# Patient Record
Sex: MALE | Race: WHITE | Employment: FULL TIME | URBAN - METROPOLITAN AREA
[De-identification: names, ages, dates, MRNs, and addresses within clinical notes are randomized per-mention and may not be internally consistent; named-entity substitution may affect disease eponyms.]

---

## 2018-04-25 ENCOUNTER — HOSPITAL ENCOUNTER (OUTPATIENT)
Dept: PHYSICAL THERAPY | Age: 53
Discharge: HOME | End: 2018-04-25
Payer: COMMERCIAL

## 2018-04-25 PROCEDURE — 97161 PT EVAL LOW COMPLEX 20 MIN: CPT | Performed by: PHYSICAL THERAPIST

## 2018-04-25 PROCEDURE — 97110 THERAPEUTIC EXERCISES: CPT | Performed by: PHYSICAL THERAPIST

## 2018-04-25 NOTE — PROGRESS NOTES
Leah Mars 31  [facility] AT [facility] [address] [facility], [address] ,[address], [address]  Phone: [phone]  Fax: [phone] / [address] [address] PHYSICAL THERAPY SERVICES  Patient Name: Troy Arce : 1965   Medical   Diagnosis: Right elbow pain [M25.521] Treatment Diagnosis: R Elbow Pain   Onset Date: 18     Referral Source: Anibal Castaneda,* Start of Care [facility]): 2018   Prior Hospitalization: See medical history Provider #: [id number]   Prior Level of Function: Full functional use/strength of right (domnant) UE   Comorbidities: Prior right elbow injury several years ago   Medications: Verified on Patient Summary List   The Plan of Care and following information is based on the information from the initial evaluation.   ===========================================================================================  Assessment / key information:  47 y/o male presents to PT s/p R elbow surgery for bicep tendon repair. He notes an injury when he reached to catch his motorcycle while moving it. He had pain and felt a pop in his elbow, which eventually turning into a significant bruise. MRI found a rupture of the bicep tendon, and he had surgery on 18. Pt presents wearing a brace on right elbow locked at 90°. His surgical incision was not seen today, as it was covered with a dressing (no drainage noted). He has mild-moderate swelling in the right elbow and proximal forearm area. PROM of right elbow was measured at 68° from full extension and 120° of flexion. Supination - 15° and pronation 25°. Pt was instructed on wearing brace at all times, locked at 90°. He was instructed in gentle ROM exercise for right elbow with emphasis on any bicep activation. Wrist and gripping exercises were added.   Pt has ROM loss, pain, swelling, weakness, and limited use of right (dominant) UE s/p distal bicep tendon repair.  ===========================================================================================  Eval Complexity: History LOW Complexity : Zero comorbidities / personal factors that will impact the outcome / POC;  Examination  MEDIUM Complexity : 3 Standardized tests and measures addressing body structure, function, activity limitation and / or participation in recreation ; Presentation MEDIUM Complexity : Evolving with changing characteristics ; Decision Making MEDIUM Complexity : FOTO score of 26-74; Overall Complexity LOW   Problem List: pain affecting function, decrease ROM, decrease strength, edema affecting function, decrease ADL/ functional abilitiies, decrease activity tolerance and decrease flexibility/ joint mobility   Treatment Plan may include any combination of the following: Therapeutic exercise, Therapeutic activities, Neuromuscular re-education, Physical agent/modality, Gait/balance training, Manual therapy, Dry Needling, Aquatic therapy, Patient education, Self Care training, Functional mobility training, Home safety training and Stair training  Patient / Family readiness to learn indicated by: asking questions, trying to perform skills and interest  Persons(s) to be included in education: patient (P)  Barriers to Learning/Limitations: None  Measures taken:    Patient Goal (s): \"to get full motion back in my arm and strengthen my right bicep so I can get back to working out on a full time basis\"   Patient self reported health status: excellent  Rehabilitation Potential: good   Short Term Goals: To be accomplished in  2  weeks:  1. Pt to manage pain to </= 2/10 with activity modification, use of cold packs, and HEP. 2. Pt independent with ROM exercises for right elbow without activating bicep muscle.  Long Term Goals: To be accomplished in  6  weeks:  1. Pt to attain full right elbow PROM, 0-135° without symptoms.   2. Pt to increase FOTO score from 32 to /= 60.  3. Pt independent with HEP for slow and progressive AA->AROM of right elbow motions. Frequency / Duration:   Patient to be seen  2-3  times per week for 6  weeks:  Patient / Caregiver education and instruction: self care, activity modification, brace/ splint application and exercises  G-Codes (GP): amarjit  Therapist Signature: Kimo Brenner PT Date: 4/52/8998   Certification Period: na Time: 10:05 AM   ===========================================================================================  I certify that the above Physical Therapy Services are being furnished while the patient is under my care. I agree with the treatment plan and certify that this therapy is necessary. Physician Signature:        Date:       Time:     Please sign and return to [facility] or you may fax the signed copy to [phone]. Thank you.

## 2018-04-25 NOTE — PROGRESS NOTES
PHYSICAL THERAPY - DAILY TREATMENT NOTE    Patient Name: Lady Peres        Date: 18  : 1965   YES Patient  Verified  Visit #:     Insurance: Payor: Isabella Conception / Plan: Vikas Chinchilla / Product Type: HMO /      In time: 6:10 Out time: 6:55   Total Treatment Time: 45     Medicare Time Tracking (below)   Total Timed Codes (min):  na 1:1 Treatment Time:  na     TREATMENT AREA = Right elbow pain [M25.521]    SUBJECTIVE  Pain Level (on 0 to 10 scale):  3  / 10   Medication Changes/New allergies or changes in medical history, any new surgeries or procedures?     NO    If yes, update Summary List   Subjective Functional Status/Changes:  []  No changes reported     See eval/POC          OBJECTIVE  Modalities Rationale:     decrease edema, decrease inflammation and decrease pain to improve patient's ability to prevent soreness   min [] Estim, type/location:                                      []  att     []  unatt     []  w/US     []  w/ice    []  w/heat    min []  Mechanical Traction: type/lbs                   []  pro   []  sup   []  int   []  cont    []  before manual    []  after manual    min []  Ultrasound, settings/location:      min []  Iontophoresis w/ dexamethasone, location:                                               []  take home patch       []  in clinic   10 min [x]  Ice     []  Heat    location/position: R elbow- supine after treatment    min []  Vasopneumatic Device, press/temp:     min []  Other:    [] Skin assessment post-treatment (if applicable):    []  intact    []  redness- no adverse reaction     []redness  adverse reaction:        15 min Therapeutic Exercise:  [x]  See flow sheet   Rationale:      increase ROM and improve coordination to improve the patients ability to regain ROM per protocol      min Therapeutic Activity:         min Neuromuscular Re-ed:         min Gait Training:       min Patient Education:  YES  Reviewed HEP   []  Progressed/Changed HEP based on:        Other Objective/Functional Measures:    FOTO - 32     Post Treatment Pain Level (on 0 to 10) scale:   1  / 10     ASSESSMENT  Assessment/Changes in Function:     Pt has ROM loss, swelling, pain, weakness and limited functional use s/p bicep tendon repair       []  See Progress Note/Recertification   Patient will continue to benefit from skilled PT services to modify and progress therapeutic interventions, address functional mobility deficits, address ROM deficits, address strength deficits, analyze and address soft tissue restrictions, analyze and cue movement patterns, analyze and modify body mechanics/ergonomics and assess and modify postural abnormalities to attain remaining goals.    Progress toward goals / Updated goals:    Goals established today     PLAN  [x]  Upgrade activities as tolerated YES Continue plan of care   []  Discharge due to :    []  Other:      Therapist: Cassie Morgan PT    Date: 4/25/2018 Time: 10:04 AM     Future Appointments  Date Time Provider David Bowen   5/1/2018 12:00 PM Cassie Morgan PT [facility] [place]   5/3/2018 1:00 PM Cassie Morgan PT [facility]

## 2018-05-01 ENCOUNTER — HOSPITAL ENCOUNTER (OUTPATIENT)
Dept: PHYSICAL THERAPY | Age: 53
Discharge: HOME | End: 2018-05-01
Payer: COMMERCIAL

## 2018-05-01 PROCEDURE — 97110 THERAPEUTIC EXERCISES: CPT | Performed by: PHYSICAL THERAPIST

## 2018-05-01 PROCEDURE — 97140 MANUAL THERAPY 1/> REGIONS: CPT | Performed by: PHYSICAL THERAPIST

## 2018-05-01 NOTE — PROGRESS NOTES
PHYSICAL THERAPY - DAILY TREATMENT NOTE    Patient Name: Trena Titus        Date: 2018  : 1965   YES Patient  Verified  Visit #:     Insurance: Payor: Berta Covarrubias / Plan: Calli Shahid / Product Type: HMO /      In time: 11:50 Out time: 12:40   Total Treatment Time: 50     Medicare Time Tracking (below)   Total Timed Codes (min):  na 1:1 Treatment Time:  na     TREATMENT AREA = Right elbow pain [M25.521]    SUBJECTIVE  Pain Level (on 0 to 10 scale):  2  / 10   Medication Changes/New allergies or changes in medical history, any new surgeries or procedures? NO    If yes, update Summary List   Subjective Functional Status/Changes:  []  No changes reported     Pt reports developing some right sided upper cervical pain since he has been wearing over shoulder sling support for elbow brace.           OBJECTIVE  Modalities Rationale:     decrease edema, decrease inflammation and decrease pain to improve patient's ability to prevent soreness   min [] Estim, type/location:                                      []  att     []  unatt     []  w/US     []  w/ice    []  w/heat    min []  Mechanical Traction: type/lbs                   []  pro   []  sup   []  int   []  cont    []  before manual    []  after manual    min []  Ultrasound, settings/location:      min []  Iontophoresis w/ dexamethasone, location:                                               []  take home patch       []  in clinic   10 min [x]  Ice     []  Heat    location/position: R elbow- supine after treatment    min []  Vasopneumatic Device, press/temp:     min []  Other:    [] Skin assessment post-treatment (if applicable):    []  intact    []  redness- no adverse reaction     []redness  adverse reaction:        30 min Therapeutic Exercise:  [x]  See flow sheet   Rationale:      increase ROM, increase strength and improve coordination to improve the patients ability to regain ROM per protocol     10 min Manual Therapy: Oscillations for right elbow supination and pronation, wrist PROM and shoulder ABD and elevation   Rationale:      increase ROM and increase tissue extensibility to improve patient's ability to regain ROM per protocol      min Therapeutic Activity:         min Neuromuscular Re-ed:         min Gait Training:       min Patient Education:  YES  Reviewed HEP   []  Progressed/Changed HEP based on: Other Objective/Functional Measures:    Pt able to achieve -55 degrees from full extension with supine active elbow extension against gravity     Post Treatment Pain Level (on 0 to 10) scale:   1  / 10     ASSESSMENT  Assessment/Changes in Function:     Pt tolerating all therex well, he did not have any symptoms into elbow. Added cervical stretching to help alleviate symptoms in neck. []  See Progress Note/Recertification   Patient will continue to benefit from skilled PT services to modify and progress therapeutic interventions, address functional mobility deficits, address ROM deficits, address strength deficits, analyze and address soft tissue restrictions, analyze and cue movement patterns, analyze and modify body mechanics/ergonomics and assess and modify postural abnormalities to attain remaining goals. Progress toward goals / Updated goals: Will continue to progress ROM per protocol.      PLAN  [x]  Upgrade activities as tolerated YES Continue plan of care   []  Discharge due to :    []  Other:      Therapist: Kriss Ramirez PT    Date: 5/1/2018 Time: 9:06 AM     Future Appointments  Date Time Provider David Bowen   5/1/2018 12:00 PM Kriss Ramirez PT [facility] [place]   5/3/2018 1:00 PM Kriss Ramirez PT [facility] [place]

## 2018-05-03 ENCOUNTER — HOSPITAL ENCOUNTER (OUTPATIENT)
Dept: PHYSICAL THERAPY | Age: 53
Discharge: HOME | End: 2018-05-03
Payer: COMMERCIAL

## 2018-05-03 PROCEDURE — 97110 THERAPEUTIC EXERCISES: CPT | Performed by: PHYSICAL THERAPIST

## 2018-05-03 PROCEDURE — 97140 MANUAL THERAPY 1/> REGIONS: CPT | Performed by: PHYSICAL THERAPIST

## 2018-05-03 NOTE — PROGRESS NOTES
PHYSICAL THERAPY - DAILY TREATMENT NOTE    Patient Name: Minerva Vernon        Date: 5/3/2018  : 1965   YES Patient  Verified  Visit #:   3   of   12  Insurance: Payor: Elois Schlatter / Plan: Diana Mask / Product Type: HMO /      In time: 1:00 Out time: 1:50   Total Treatment Time: 45     Medicare Time Tracking (below)   Total Timed Codes (min):  na 1:1 Treatment Time:  na     TREATMENT AREA = Right elbow pain [M25.521]    SUBJECTIVE  Pain Level (on 0 to 10 scale):  1  / 10   Medication Changes/New allergies or changes in medical history, any new surgeries or procedures? NO    If yes, update Summary List   Subjective Functional Status/Changes:  []  No changes reported     Pt reports seeing MD and had stitches removed.           OBJECTIVE  Modalities Rationale:     decrease edema, decrease inflammation and decrease pain to improve patient's ability to prevent soreness   min [] Estim, type/location:                                      []  att     []  unatt     []  w/US     []  w/ice    []  w/heat    min []  Mechanical Traction: type/lbs                   []  pro   []  sup   []  int   []  cont    []  before manual    []  after manual    min []  Ultrasound, settings/location:      min []  Iontophoresis w/ dexamethasone, location:                                               []  take home patch       []  in clinic   10 min [x]  Ice     []  Heat    location/position: R elbow - supine after treatment    min []  Vasopneumatic Device, press/temp:     min []  Other:    [] Skin assessment post-treatment (if applicable):    []  intact    []  redness- no adverse reaction     []redness  adverse reaction:        25 min Therapeutic Exercise:  [x]  See flow sheet   Rationale:      increase ROM and improve coordination to improve the patients ability to progress per protocol     10 min Manual Therapy: Oscillations and manual stretching to supination/pronation at 90° flexion followed by flexion   Rationale: decrease pain and increase ROM to improve patient's ability to regain ROM per protocol     min Therapeutic Activity:         min Neuromuscular Re-ed:         min Gait Training:       min Patient Education:  YES  Reviewed HEP   []  Progressed/Changed HEP based on: Other Objective/Functional Measures:    Pt achieved 38° from full extension today     Post Treatment Pain Level (on 0 to 10) scale:   1  / 10     ASSESSMENT  Assessment/Changes in Function:     Pt is progressing well, and was encouraged to perform only gentle ROM. []  See Progress Note/Recertification   Patient will continue to benefit from skilled PT services to modify and progress therapeutic interventions, address functional mobility deficits, address ROM deficits, address strength deficits, analyze and address soft tissue restrictions, analyze and cue movement patterns and analyze and modify body mechanics/ergonomics to attain remaining goals. Progress toward goals / Updated goals: Will continue to prorgess PROM as tolerated.      PLAN  [x]  Upgrade activities as tolerated YES Continue plan of care   []  Discharge due to :    []  Other:      Therapist: Javed Bryant PT    Date: 5/3/2018 Time: 8:58 AM     Future Appointments  Date Time Provider David Bowen   5/3/2018 1:00 PM Javed Bryant PT [facility] [place]   5/9/2018 4:00 PM Javed Bryant PT [facility] [place]

## 2018-05-09 ENCOUNTER — HOSPITAL ENCOUNTER (OUTPATIENT)
Dept: PHYSICAL THERAPY | Age: 53
Discharge: HOME | End: 2018-05-09
Payer: COMMERCIAL

## 2018-05-09 PROCEDURE — 97140 MANUAL THERAPY 1/> REGIONS: CPT | Performed by: PHYSICAL THERAPIST

## 2018-05-09 PROCEDURE — 97110 THERAPEUTIC EXERCISES: CPT | Performed by: PHYSICAL THERAPIST

## 2018-05-09 NOTE — PROGRESS NOTES
PHYSICAL THERAPY - DAILY TREATMENT NOTE    Patient Name: Halie Fuentes        Date: 2018  : 1965   YES Patient  Verified  Visit #:     Insurance: Payor: [employer] / Plan: Pepito Cohen / Product Type: HMO /      In time: 4:00 Out time: 4:50   Total Treatment Time: 50     Medicare Time Tracking (below)   Total Timed Codes (min):  na 1:1 Treatment Time:  na     TREATMENT AREA = Right elbow pain [M25.521]    SUBJECTIVE  Pain Level (on 0 to 10 scale):  0  / 10   Medication Changes/New allergies or changes in medical history, any new surgeries or procedures? NO    If yes, update Summary List   Subjective Functional Status/Changes:  []  No changes reported     Pt notes elbow ROM is improving slowly, and he is wearing brace at all times.           OBJECTIVE  Modalities Rationale:     decrease edema, decrease inflammation and decrease pain to improve patient's ability to prevent soreness   min [] Estim, type/location:                                      []  att     []  unatt     []  w/US     []  w/ice    []  w/heat    min []  Mechanical Traction: type/lbs                   []  pro   []  sup   []  int   []  cont    []  before manual    []  after manual    min []  Ultrasound, settings/location:      min []  Iontophoresis w/ dexamethasone, location:                                               []  take home patch       []  in clinic   10 min [x]  Ice     []  Heat    location/position: R Elbow - supine after treatment    min []  Vasopneumatic Device, press/temp:     min []  Other:    [] Skin assessment post-treatment (if applicable):    []  intact    []  redness- no adverse reaction     []redness  adverse reaction:        30 min Therapeutic Exercise:  [x]  See flow sheet   Rationale:      increase ROM, increase strength and improve coordination to improve the patients ability to regain ROM per protocol     10 min Manual Therapy: scar mobilization and pronation/supination stretch as tolerated Rationale:      decrease pain, increase ROM and increase tissue extensibility to improve patient's ability to regain functional ROM     min Therapeutic Activity:         min Neuromuscular Re-ed:         min Gait Training:       min Patient Education:  YES  Reviewed HEP   []  Progressed/Changed HEP based on: Other Objective/Functional Measures:    Achieved 135° of passive flexion, and -23° for full extension     Post Treatment Pain Level (on 0 to 10) scale:   0  / 10     ASSESSMENT  Assessment/Changes in Function:     Had pt perform scar/skin mobilization with movement during supine tricep press to help increase anterior elbow scar mobility. Extension ROM significantly limited when holding skin. []  See Progress Note/Recertification   Patient will continue to benefit from skilled PT services to modify and progress therapeutic interventions, address functional mobility deficits, address ROM deficits, address strength deficits, analyze and address soft tissue restrictions, analyze and cue movement patterns, analyze and modify body mechanics/ergonomics and assess and modify postural abnormalities to attain remaining goals. Progress toward goals / Updated goals: Will continue to progress therex per protocol.      PLAN  [x]  Upgrade activities as tolerated YES Continue plan of care   []  Discharge due to :    []  Other:      Therapist: Malka Plaza PT    Date: 5/9/2018 Time: 10:59 AM     Future Appointments  Date Time Provider David Bowen   5/9/2018 4:00 PM Malka Plaza PT [facility] [place]

## 2018-05-17 ENCOUNTER — HOSPITAL ENCOUNTER (OUTPATIENT)
Dept: PHYSICAL THERAPY | Age: 53
Discharge: HOME | End: 2018-05-17
Payer: COMMERCIAL

## 2018-05-17 PROCEDURE — 97140 MANUAL THERAPY 1/> REGIONS: CPT | Performed by: PHYSICAL THERAPIST

## 2018-05-17 PROCEDURE — 97110 THERAPEUTIC EXERCISES: CPT | Performed by: PHYSICAL THERAPIST

## 2018-05-17 NOTE — PROGRESS NOTES
PHYSICAL THERAPY - DAILY TREATMENT NOTE    Patient Name: Annabelle Nuñez        Date: 2018  : 1965   YES Patient  Verified  Visit #:      of   12  Insurance: Payor: Armaan Beltre / Plan: [employer] Back / Product Type: HMO /      In time: 1:55 Out time: 2:45   Total Treatment Time: 50     Medicare Time Tracking (below)   Total Timed Codes (min):  na 1:1 Treatment Time:  na     TREATMENT AREA = Right elbow pain [M25.521]    SUBJECTIVE  Pain Level (on 0 to 10 scale):  0  / 10   Medication Changes/New allergies or changes in medical history, any new surgeries or procedures? NO    If yes, update Summary List   Subjective Functional Status/Changes:  []  No changes reported     Pt reports doing HEP and wearing brace at all times. He expresses concern about lump in anterior elbow just deep to scar.           OBJECTIVE  Modalities Rationale:     decrease edema, decrease inflammation and decrease pain to improve patient's ability to prevent soreness   min [] Estim, type/location:                                      []  att     []  unatt     []  w/US     []  w/ice    []  w/heat    min []  Mechanical Traction: type/lbs                   []  pro   []  sup   []  int   []  cont    []  before manual    []  after manual    min []  Ultrasound, settings/location:      min []  Iontophoresis w/ dexamethasone, location:                                               []  take home patch       []  in clinic   10 min [x]  Ice     []  Heat    location/position: R elbow - supine after treatment    min []  Vasopneumatic Device, press/temp:     min []  Other:    [] Skin assessment post-treatment (if applicable):    []  intact    []  redness- no adverse reaction     []redness  adverse reaction:        30 min Therapeutic Exercise:  [x]  See flow sheet   Rationale:      increase ROM, increase strength and improve coordination to improve the patients ability to regain functional ROM per protocol     10 min Manual Therapy: PROM into supination and pronation - scar mobilization   Rationale:      decrease pain, increase ROM and increase tissue extensibility to improve patient's ability to regain functional ROM     min Therapeutic Activity:         min Neuromuscular Re-ed:         min Gait Training:       min Patient Education:  YES  Reviewed HEP   []  Progressed/Changed HEP based on: Other Objective/Functional Measures:    Pt's elbow extension PROM is ~-10° from full. Post Treatment Pain Level (on 0 to 10) scale:   0  / 10     ASSESSMENT  Assessment/Changes in Function:     Pt encouraged to increase scar/skin mobilization in a proximal direction. []  See Progress Note/Recertification   Patient will continue to benefit from skilled PT services to modify and progress therapeutic interventions, address functional mobility deficits, address ROM deficits, address strength deficits, analyze and address soft tissue restrictions, analyze and cue movement patterns and analyze and modify body mechanics/ergonomics to attain remaining goals. Progress toward goals / Updated goals: Will continue QW and progress PROM per protocol.      PLAN  [x]  Upgrade activities as tolerated YES Continue plan of care   []  Discharge due to :    []  Other:      Therapist: Davey Monge PT    Date: 5/17/2018 Time: 1:16 PM     Future Appointments  Date Time Provider David Bowen   5/17/2018 2:00 PM [place]y [place] [facility] [place]

## 2018-05-23 ENCOUNTER — HOSPITAL ENCOUNTER (OUTPATIENT)
Dept: PHYSICAL THERAPY | Age: 53
Discharge: HOME | End: 2018-05-23
Payer: COMMERCIAL

## 2018-05-23 PROCEDURE — 97140 MANUAL THERAPY 1/> REGIONS: CPT | Performed by: PHYSICAL THERAPIST

## 2018-05-23 PROCEDURE — 97110 THERAPEUTIC EXERCISES: CPT | Performed by: PHYSICAL THERAPIST

## 2018-05-23 NOTE — PROGRESS NOTES
PHYSICAL THERAPY - DAILY TREATMENT NOTE    Patient Name: Sigifredo Alanis        Date: 2018  : 1965   YES Patient  Verified  Visit #:     Insurance: Payor: Mckayla Hughes / Plan: Che Christianson / Product Type: HMO /      In time: 11:30 Out time: 12:25   Total Treatment Time: 55     Medicare Time Tracking (below)   Total Timed Codes (min):  na 1:1 Treatment Time:  na     TREATMENT AREA = Right elbow pain [M25.521]    SUBJECTIVE  Pain Level (on 0 to 10 scale):  0  / 10   Medication Changes/New allergies or changes in medical history, any new surgeries or procedures? NO    If yes, update Summary List   Subjective Functional Status/Changes:  []  No changes reported     Pt reports having one episode of reaching into extension while putting on his shirt and felt pain radiate form posterior elbow to wrist.  These symptoms eventually faded and he has not felt them today.           OBJECTIVE  Modalities Rationale:     decrease edema, decrease inflammation and decrease pain to improve patient's ability to regain functional PROM   min [] Estim, type/location:                                      []  att     []  unatt     []  w/US     []  w/ice    []  w/heat    min []  Mechanical Traction: type/lbs                   []  pro   []  sup   []  int   []  cont    []  before manual    []  after manual    min []  Ultrasound, settings/location:      min []  Iontophoresis w/ dexamethasone, location:                                               []  take home patch       []  in clinic   10 min [x]  Ice     []  Heat    location/position: R elbow - supine after treatment    min []  Vasopneumatic Device, press/temp:     min []  Other:    [] Skin assessment post-treatment (if applicable):    []  intact    []  redness- no adverse reaction     []redness  adverse reaction:        25 min Therapeutic Exercise:  [x]  See flow sheet   Rationale:      increase ROM and improve coordination to improve the patients ability to regain functional PROM     15 min Manual Therapy: PROM into sup/pron, added genlte over pressure to extension stretching (all in supine), scar mobilization   Rationale:      decrease pain, increase ROM and increase tissue extensibility to improve patient's ability to regain full PROM     min Therapeutic Activity:         min Neuromuscular Re-ed:         min Gait Training:       min Patient Education:  YES  Reviewed HEP   []  Progressed/Changed HEP based on: Other Objective/Functional Measures:    Pt has less prominent scarring at incision site     Post Treatment Pain Level (on 0 to 10) scale:   0  / 10     ASSESSMENT  Assessment/Changes in Function:     Pt tolerated stretching into flexion and extension well. He remains limited into extension by limited scar mobility anteriorly. []  See Progress Note/Recertification   Patient will continue to benefit from skilled PT services to modify and progress therapeutic interventions, address functional mobility deficits, address ROM deficits, address strength deficits, analyze and address soft tissue restrictions and analyze and cue movement patterns to attain remaining goals. Progress toward goals / Updated goals: Will continue per protocol and write progress note next session for follow-up with MD.     PLAN  [x]  Upgrade activities as tolerated YES Continue plan of care   []  Discharge due to :    []  Other:      Therapist: Nathaniel Horvath, PT    Date: 5/23/2018 Time: 11:47 AM     No future appointments.

## 2018-05-31 ENCOUNTER — HOSPITAL ENCOUNTER (OUTPATIENT)
Dept: PHYSICAL THERAPY | Age: 53
Discharge: HOME | End: 2018-05-31
Payer: COMMERCIAL

## 2018-05-31 PROCEDURE — 97110 THERAPEUTIC EXERCISES: CPT | Performed by: PHYSICAL THERAPIST

## 2018-05-31 PROCEDURE — 97140 MANUAL THERAPY 1/> REGIONS: CPT | Performed by: PHYSICAL THERAPIST

## 2018-05-31 NOTE — PROGRESS NOTES
[address]  [facility] AT [address] [place] [address] Alfredo [facility] 54, [address] [place] [address], [address]  Phone: [phone]  Fax: [phone]  PROGRESS NOTE  Patient Name: Kim Hill : 1965   Treatment/Medical Diagnosis: Right elbow pain [M25.521]   Referral Source: Evie Abernathy,*     Date of Initial Visit: 18 Attended Visits: 7 Missed Visits: 0     SUMMARY OF TREATMENT  R elbow flexibility exercises/PROM, manual stretching, scar massage/mobilization, and modalities for pain/swelling management. CURRENT STATUS  Pt was last see on 18, and denied having any pain. He has been wearing his brace (locked at 90 degrees) at all times except bathing/dressing. R elbow PROM was measured at 135 degrees of flexion and -10 degrees from full extension. Pt describes posterior elbow discomfort which limites his lebow flexion. Supination/pronation PROM is WNL. Pt has increased scar tissue formation at incision site, but ROM has been gradually improving and he is showing good tolerance to therex. Goal/Measure of Progress Goal Met? 1.  Pt to manage pain to </= 2/10 with activity modification, use of cold packs, and HEP. Status at last Eval: 3/10 Current Status: 0/10 yes   2. Pt independent with ROM exercises for right elbow without activating bicep muscle. Status at last Eval: - Current Status: Met yes   3. Pt to attain full right elbow PROM, 0-135° without symptoms. Status at last Eval: -68 degrees extension, 120 degrees fleixon Current Status: -10 degrees extension, 135 degrees fleixon Progressing     New Goals to be achieved in __4__  weeks:  1. Pt to increase FOTO score from 32 to /= 60.   2.  Pt independent with HEP for slow and progressive AA->AROM of right elbow motions. 3.  Pt to attain full right elbow PROM, 0-135° without symptoms.    4.  Pt to regain functional AROM per MD orders to allow normal use for ADLs, eating, grooming, etc. RECOMMENDATIONS  Continue PT, BIW, for an additonal 4 weeks to regain functional AROM per MD orders. Please advise on progression of AA->AROM, and any restrictions going forward. Thank you. If you have any questions/comments please contact us directly at [phone]. Thank you for allowing us to assist in the care of your patient. Therapist Signature: Kimo Brenner PT Date: 5/31/2018     Time: 12:52 PM   NOTE TO PHYSICIAN:  PLEASE COMPLETE THE ORDERS BELOW AND FAX TO   [facility]: [phone]. If you are unable to process this request in 24 hours please contact our office: [phone].    ___ I have read the above report and request that my patient continue as recommended.   ___ I have read the above report and request that my patient continue therapy with the following changes/special instructions:_________________________________________________________   ___ I have read the above report and request that my patient be discharged from therapy.      Physician Signature:        Date:       Time:

## 2018-05-31 NOTE — PROGRESS NOTES
PHYSICAL THERAPY - DAILY TREATMENT NOTE    Patient Name: Sarah Nicole        Date: 2018  : 1965   YES Patient  Verified  Visit #:     Insurance: Payor: Azeb Faulkner / Plan: Shilo Tate / Product Type: HMO /      In time: 1:05 Out time: 1:55   Total Treatment Time: 50     Medicare Time Tracking (below)   Total Timed Codes (min):  na 1:1 Treatment Time:  na     TREATMENT AREA = Right elbow pain [M25.521]    SUBJECTIVE  Pain Level (on 0 to 10 scale):  0  / 10   Medication Changes/New allergies or changes in medical history, any new surgeries or procedures? NO    If yes, update Summary List   Subjective Functional Status/Changes:  []  No changes reported     Pt denies having any pain or soreness in anterior elbow, but has had some \"pulling\" sensation into his forearm with stretching.           OBJECTIVE  Modalities Rationale:     decrease edema, decrease inflammation and decrease pain to improve patient's ability to prevent soreness   min [] Estim, type/location:                                      []  att     []  unatt     []  w/US     []  w/ice    []  w/heat    min []  Mechanical Traction: type/lbs                   []  pro   []  sup   []  int   []  cont    []  before manual    []  after manual    min []  Ultrasound, settings/location:      min []  Iontophoresis w/ dexamethasone, location:                                               []  take home patch       []  in clinic   10 min [x]  Ice     []  Heat    location/position: R elbow - supine after treatment    min []  Vasopneumatic Device, press/temp:     min []  Other:    [] Skin assessment post-treatment (if applicable):    []  intact    []  redness- no adverse reaction     []redness  adverse reaction:        30 min Therapeutic Exercise:  [x]  See flow sheet   Rationale:      increase ROM, increase strength and improve coordination to improve the patients ability to regain functional ROM     10 min Manual Therapy: PROM into sup/pron, added genlte over pressure to extension stretching (all in supine), scar mobilization   Rationale:      decrease pain, increase ROM and increase tissue extensibility to improve patient's ability to regain functional ROM     min Therapeutic Activity:         min Neuromuscular Re-ed:         min Gait Training:       min Patient Education:  YES  Reviewed HEP   []  Progressed/Changed HEP based on: Other Objective/Functional Measures:    Pt achieved 135° flexion, and -10° from full extension     Post Treatment Pain Level (on 0 to 10) scale:   0  / 10     ASSESSMENT  Assessment/Changes in Function:     Pt remains limited into extension due to anterior incisional tightness. Sent PN to MD ahead tomorrow's follow-up. [x]  See Progress Note   Patient will continue to benefit from skilled PT services to modify and progress therapeutic interventions, address functional mobility deficits, address ROM deficits, address strength deficits, analyze and address soft tissue restrictions, analyze and cue movement patterns, analyze and modify body mechanics/ergonomics and assess and modify postural abnormalities to attain remaining goals. Progress toward goals / Updated goals: Will continue to progress therex per MD orders.      PLAN  [x]  Upgrade activities as tolerated YES Continue plan of care   []  Discharge due to :    []  Other:      Therapist: Caitlyn Wadsworth PT    Date: 5/31/2018 Time: 9:12 AM     Future Appointments  Date Time Provider David oBwen   5/31/2018 1:00 PM Caitlyn Wadsworth PT [facility] [place]

## 2018-06-06 ENCOUNTER — HOSPITAL ENCOUNTER (OUTPATIENT)
Dept: PHYSICAL THERAPY | Age: 53
Discharge: HOME | End: 2018-06-06
Payer: COMMERCIAL

## 2018-06-06 PROCEDURE — 97110 THERAPEUTIC EXERCISES: CPT | Performed by: PHYSICAL THERAPIST

## 2018-06-06 NOTE — PROGRESS NOTES
PHYSICAL THERAPY - DAILY TREATMENT NOTE    Patient Name: Divya Richmond        Date: 2018  : 1965   YES Patient  Verified  Visit #:     Insurance: Payor: Aubree Rolon / Plan: Baldemar Herbert / Product Type: HMO /      In time: 12:00 Out time: 1:10   Total Treatment Time: 55     Medicare Time Tracking (below)   Total Timed Codes (min):  na 1:1 Treatment Time:  na     TREATMENT AREA = Right elbow pain [M25.521]    SUBJECTIVE  Pain Level (on 0 to 10 scale):  0  / 10   Medication Changes/New allergies or changes in medical history, any new surgeries or procedures?     NO    If yes, update Summary List   Subjective Functional Status/Changes:  []  No changes reported     Pt reports seeing MD and had brace opened up (with stop at 30° from full extension)          OBJECTIVE  Modalities Rationale:     decrease edema, decrease inflammation and decrease pain to improve patient's ability to prevent soreness   min [] Estim, type/location:                                      []  att     []  unatt     []  w/US     []  w/ice    []  w/heat    min []  Mechanical Traction: type/lbs                   []  pro   []  sup   []  int   []  cont    []  before manual    []  after manual    min []  Ultrasound, settings/location:      min []  Iontophoresis w/ dexamethasone, location:                                               []  take home patch       []  in clinic   10 min [x]  Ice     []  Heat    location/position: R elbow - supine after treatment    min []  Vasopneumatic Device, press/temp:     min []  Other:    [] Skin assessment post-treatment (if applicable):    []  intact    []  redness- no adverse reaction     []redness  adverse reaction:        45 min Therapeutic Exercise:  [x]  See flow sheet   Rationale:      increase ROM, increase strength and improve coordination to improve the patients ability to regain functional ROM      min Therapeutic Activity:         min Neuromuscular Re-ed:         min Gait Training:       min Patient Education:  YES  Reviewed HEP   []  Progressed/Changed HEP based on: Other Objective/Functional Measures: Added standing tricep press downs w YTB, AROM right elbow flexion, Supination/pronation AROM     Post Treatment Pain Level (on 0 to 10) scale:   0  / 10     ASSESSMENT  Assessment/Changes in Function:     Pt showing good tolerance to new exercises and cautioned pt to limit resistance and speed of reaching with right UE.      []  See Progress Note/Recertification   Patient will continue to benefit from skilled PT services to modify and progress therapeutic interventions, address functional mobility deficits, address ROM deficits, address strength deficits, analyze and address soft tissue restrictions, analyze and cue movement patterns, analyze and modify body mechanics/ergonomics and assess and modify postural abnormalities to attain remaining goals. Progress toward goals / Updated goals:     Will continue to progress with AROM as tolerated     PLAN  [x]  Upgrade activities as tolerated YES Continue plan of care   []  Discharge due to :    []  Other:      Therapist: Chirag Diaz PT    Date: 6/6/2018 Time: 10:32 AM     Future Appointments  Date Time Provider David Bowen   6/6/2018 12:00 PM Chirag Diaz PT [facility] [place]

## 2018-06-13 ENCOUNTER — APPOINTMENT (OUTPATIENT)
Dept: PHYSICAL THERAPY | Age: 53
End: 2018-06-13
Payer: COMMERCIAL

## 2018-06-14 ENCOUNTER — HOSPITAL ENCOUNTER (OUTPATIENT)
Dept: PHYSICAL THERAPY | Age: 53
Discharge: HOME | End: 2018-06-14
Payer: COMMERCIAL

## 2018-06-14 PROCEDURE — 97110 THERAPEUTIC EXERCISES: CPT | Performed by: PHYSICAL THERAPIST

## 2018-06-14 NOTE — PROGRESS NOTES
PHYSICAL THERAPY - DAILY TREATMENT NOTE    Patient Name: Arnaud Mullins        Date: 2018  : 1965   YES Patient  Verified  Visit #:     Insurance: Payor: Magno Moyer / Plan: Rober Covarrubias / Product Type: HMO /      In time: 10:25 Out time: 11:20   Total Treatment Time: 55     Medicare Time Tracking (below)   Total Timed Codes (min):  na 1:1 Treatment Time:  na     TREATMENT AREA = Right elbow pain [M25.521]    SUBJECTIVE  Pain Level (on 0 to 10 scale):  0  / 10   Medication Changes/New allergies or changes in medical history, any new surgeries or procedures? NO    If yes, update Summary List   Subjective Functional Status/Changes:  []  No changes reported     Pt denies having any pain or discomfort in his elbow.           OBJECTIVE  Modalities Rationale:     decrease edema, decrease inflammation and decrease pain to improve patient's ability to prevent soreness   min [] Estim, type/location:                                      []  att     []  unatt     []  w/US     []  w/ice    []  w/heat    min []  Mechanical Traction: type/lbs                   []  pro   []  sup   []  int   []  cont    []  before manual    []  after manual    min []  Ultrasound, settings/location:      min []  Iontophoresis w/ dexamethasone, location:                                               []  take home patch       []  in clinic   10 min [x]  Ice     []  Heat    location/position: R elbow- supine after treatment    min []  Vasopneumatic Device, press/temp:     min []  Other:    [] Skin assessment post-treatment (if applicable):    []  intact    []  redness- no adverse reaction     []redness  adverse reaction:        45 min Therapeutic Exercise:  [x]  See flow sheet   Rationale:      increase ROM, increase strength and improve coordination to improve the patients ability to regai functional AROM      min Therapeutic Activity:         min Neuromuscular Re-ed:         min Gait Training:       min Patient Education:  YES  Reviewed HEP   []  Progressed/Changed HEP based on: Other Objective/Functional Measures: Added standing active elbow extension with forearm pronation to gently stretch bicep. Added RC strengthening, wall push up, and active elevation to 90° (arm circles)     Post Treatment Pain Level (on 0 to 10) scale:   0  / 10     ASSESSMENT  Assessment/Changes in Function:     Pt tolerated all new exercises well. He has been mana to control bicep stresses and continue to wear brace locked at -30° extension during the day. []  See Progress Note/Recertification   Patient will continue to benefit from skilled PT services to modify and progress therapeutic interventions, address functional mobility deficits, address ROM deficits, analyze and address soft tissue restrictions, analyze and cue movement patterns, analyze and modify body mechanics/ergonomics and assess and modify postural abnormalities to attain remaining goals. Progress toward goals / Updated goals: Will continue to progress therex as tolerated per protocol.      PLAN  [x]  Upgrade activities as tolerated YES Continue plan of care   []  Discharge due to :    []  Other:      Therapist: Lyric Palmer PT    Date: 6/14/2018 Time: 10:00 AM     Future Appointments  Date Time Provider David Bowen   6/14/2018 10:30 AM Lyric Palmer [place] [facility] [place]   6/20/2018 12:00 PM Lyric Palmer PT [facility] [place]

## 2018-06-20 ENCOUNTER — APPOINTMENT (OUTPATIENT)
Dept: PHYSICAL THERAPY | Age: 53
End: 2018-06-20
Payer: COMMERCIAL

## 2018-06-21 ENCOUNTER — HOSPITAL ENCOUNTER (OUTPATIENT)
Dept: PHYSICAL THERAPY | Age: 53
Discharge: HOME | End: 2018-06-21
Payer: COMMERCIAL

## 2018-06-21 PROCEDURE — 97110 THERAPEUTIC EXERCISES: CPT | Performed by: PHYSICAL THERAPIST

## 2018-06-21 NOTE — PROGRESS NOTES
[address]  [facility] AT [address] [place] [address] Alfredo [facility], [address] [place] [address], [address]  Phone: [phone]  Fax: [phone]  PROGRESS NOTE  Patient Name: Arnie Levine : 1965   Treatment/Medical Diagnosis: Right elbow pain [M25.521]   Referral Source: Leelee Suarez,*     Date of Initial Visit: 18 Attended Visits: 10 Missed Visits: 0     SUMMARY OF TREATMENT  R elbow flexibility exercises/PROM, manual stretching, scar massage/mobilization, and modalities for pain/swelling management. CURRENT STATUS  Pt was last seen on 18, and had no pain or soreness in right elbow. He expresses worry over the scar tension and area of swelling just distal to his scar. Upon examination, there is minimal swelling in forearm/elbow, but his scar remains bound down anteriorly, as seen with end range pronation with movement into elbow extension. From a functional standing point, his right elbow shows full AROM into flexion, and extension (with only mild restriction into extension compared to the left). Pt has tolerated right elbow AROM with light resistance. He has been seen in PT QW.    RECOMMENDATIONS  Continue PT QW with progression of AROM as approved by MD.  Please advise on any restrictions moving forward. Thank you. If you have any questions/comments please contact us directly at [phone]. Thank you for allowing us to assist in the care of your patient. Therapist Signature: Abdi Vuong PT Date: 2018     Time: 2:12 PM   NOTE TO PHYSICIAN:  PLEASE COMPLETE THE ORDERS BELOW AND FAX TO   [facility] Physical Therapy: [phone].   If you are unable to process this request in 24 hours please contact our office: [phone].    ___ I have read the above report and request that my patient continue as recommended.   ___ I have read the above report and request that my patient continue therapy with the following changes/special instructions:_________________________________________________________   ___ I have read the above report and request that my patient be discharged from therapy.      Physician Signature:        Date:       Time:

## 2018-06-21 NOTE — PROGRESS NOTES
PHYSICAL THERAPY - DAILY TREATMENT NOTE    Patient Name: Ilan Moffett        Date: 2018  : 1965   YES Patient  Verified  Visit #:   10   of   12  Insurance: Payor: Jose Cargo / Plan: Trey Zurita / Product Type: HMO /      In time: 1:00 Out time: 1:55   Total Treatment Time: 55     Medicare Time Tracking (below)   Total Timed Codes (min):  na 1:1 Treatment Time:  na     TREATMENT AREA = Right elbow pain [M25.521]    SUBJECTIVE  Pain Level (on 0 to 10 scale):  0  / 10   Medication Changes/New allergies or changes in medical history, any new surgeries or procedures? NO    If yes, update Summary List   Subjective Functional Status/Changes:  []  No changes reported     Pt reports being concerned about anterior scar and area of swelling just distal to scar.           OBJECTIVE  Modalities Rationale:     decrease edema, decrease inflammation and decrease pain to improve patient's ability to prevent soreness   min [] Estim, type/location:                                      []  att     []  unatt     []  w/US     []  w/ice    []  w/heat    min []  Mechanical Traction: type/lbs                   []  pro   []  sup   []  int   []  cont    []  before manual    []  after manual    min []  Ultrasound, settings/location:      min []  Iontophoresis w/ dexamethasone, location:                                               []  take home patch       []  in clinic   10 min [x]  Ice     []  Heat    location/position: R elbow- supine after treatment    min []  Vasopneumatic Device, press/temp:     min []  Other:    [] Skin assessment post-treatment (if applicable):    []  intact    []  redness- no adverse reaction     []redness  adverse reaction:        45(40) min Therapeutic Exercise:  [x]  See flow sheet   Rationale:      increase ROM, increase strength and improve coordination to improve the patients ability to regain functional ROM      min Therapeutic Activity:         min Neuromuscular Re-ed:         min Gait Training:       min Patient Education:  YES  Reviewed HEP   []  Progressed/Changed HEP based on: Other Objective/Functional Measures: Added UBE, resisted supination/pronaiton at 90 degrees with 1lb dowel, and light resistance to elbow flexion AROM     Post Treatment Pain Level (on 0 to 10) scale:   0  / 10     ASSESSMENT  Assessment/Changes in Function:     Pt is progressing well with all therex. He had wrist pain when attemtping to perform push up on raised priscilla, but improved after stretching/joint mobilization. [x]  See Progress Note   Patient will continue to benefit from skilled PT services to modify and progress therapeutic interventions, address functional mobility deficits, address ROM deficits, address strength deficits, analyze and address soft tissue restrictions, analyze and cue movement patterns, analyze and modify body mechanics/ergonomics and assess and modify postural abnormalities to attain remaining goals. Progress toward goals / Updated goals:     Will progress per MD recommendations      PLAN  [x]  Upgrade activities as tolerated YES Continue plan of care   []  Discharge due to :    []  Other:      Therapist: Lyric Palmer PT    Date: 6/21/2018 Time: 9:56 AM     Future Appointments  Date Time Provider David Bowen   6/21/2018 1:00 PM Lyric Palmer PT [facility] [place]

## 2018-07-12 ENCOUNTER — APPOINTMENT (OUTPATIENT)
Dept: PHYSICAL THERAPY | Age: 53
End: 2018-07-12
Payer: COMMERCIAL

## 2018-07-18 ENCOUNTER — HOSPITAL ENCOUNTER (OUTPATIENT)
Dept: PHYSICAL THERAPY | Age: 53
Discharge: HOME | End: 2018-07-18
Payer: COMMERCIAL

## 2018-07-18 PROCEDURE — 97110 THERAPEUTIC EXERCISES: CPT | Performed by: PHYSICAL THERAPIST

## 2018-07-18 NOTE — PROGRESS NOTES
[address]  [facility] AT [address] [place] [address]  Dung Fuentes [facility], [address] [address] [place] [id number], [address]  Phone: [phone]  Fax: [phone] SUMMARY  Patient Name: Gaston Arshad : 1965   Treatment/Medical Diagnosis: Right elbow pain [M25.521]   Referral Source: Saulo Long,*     Date of Initial Visit: 18 Attended Visits: 11 Missed Visits: 0     SUMMARY OF TREATMENT  R elbow/wrist ROM/strengthening/endurance, and manual stretching and cold packs to manage symptoms. CURRENT STATUS  Pt was last seen on 18, was not having any regular pain in his right elbow. He has been out of his brace for the past 2 weeks without difficulty. Pt has near full flexion and extension elbow ROM. Pronation at end range extension remains mildly limited, and creates a \"puckering\" of his incision anteriorly. Pt is independent with HEP for progressive strengthening of R UE, and it was mutually decided to pt from PT at this time. Goal/Measure of Progress Goal Met? 1.  Pt to increase FOTO score from 32 to /= 60. Status at last Eval: 32 Current Status: 69 yes   2. Pt independent with HEP for slow and progressive AA->AROM of right elbow motions. Status at last Eval: Partial Current Status: Met yes   3. Pt to regain functional AROM per MD orders to allow normal use for ADLs, eating, grooming, etc.   Status at last Eval: - Current Status: Met yes   4. Pt to attain full right elbow PROM, 0-135° without symptoms. Status at last Eval: -10 degrees from full extension, 135 degrees flexion Current Status: -5 - 135 degrees Progressing     RECOMMENDATIONS  Discontinue therapy. Progressing towards or have reached established goals. If you have any questions/comments please contact us directly at [phone]. Thank you for allowing us to assist in the care of your patient. Therapist Signature:  Johnnie Isabel PT Date: 18     Time: 2:26 PM

## 2018-07-18 NOTE — PROGRESS NOTES
PHYSICAL THERAPY - DAILY TREATMENT NOTE    Patient Name: Emily Hooker        Date: 2018  : 1965   YES Patient  Verified  Visit #:     Insurance: Payor: Darryle Blander / Plan: Dunia Shah / Product Type: HMO /      In time: 12:00 Out time: 12:45   Total Treatment Time: 45     Medicare Time Tracking (below)   Total Timed Codes (min):  na 1:1 Treatment Time:  na     TREATMENT AREA = Right elbow pain [M25.521]    SUBJECTIVE  Pain Level (on 0 to 10 scale):  0  / 10   Medication Changes/New allergies or changes in medical history, any new surgeries or procedures? NO    If yes, update Summary List   Subjective Functional Status/Changes:  []  No changes reported     Pt reports being out of brace for the past 2 weeks without any pain/soreness.           OBJECTIVE  Modalities Rationale:     decrease edema, decrease inflammation and decrease pain to improve patient's ability to prevent soreness   min [] Estim, type/location:                                      []  att     []  unatt     []  w/US     []  w/ice    []  w/heat    min []  Mechanical Traction: type/lbs                   []  pro   []  sup   []  int   []  cont    []  before manual    []  after manual    min []  Ultrasound, settings/location:      min []  Iontophoresis w/ dexamethasone, location:                                               []  take home patch       []  in clinic   10 min [x]  Ice     []  Heat    location/position: R elbow - supine after treatment    min []  Vasopneumatic Device, press/temp:     min []  Other:    [] Skin assessment post-treatment (if applicable):    []  intact    []  redness- no adverse reaction     []redness  adverse reaction:        45 min Therapeutic Exercise:  [x]  See flow sheet   Rationale:      increase ROM, increase strength and improve coordination to improve the patients ability to regain functional ROM/strength        min Therapeutic Activity:         min Neuromuscular Re-ed:         min Gait Training:       min Patient Education:  YES  Reviewed HEP   []  Progressed/Changed HEP based on: Other Objective/Functional Measures:    Pt has near full right elbow extension, but still has some \"puckering\" at his scar with pronation while in end range extension. Post Treatment Pain Level (on 0 to 10) scale:   0  / 10     ASSESSMENT  Assessment/Changes in Function:     Pt is progressing toward or has achieved all LTGs.         [x]  See DC Note      Progress toward goals / Updated goals:    DC w HEP     PLAN  []  Upgrade activities as tolerated    [x]  Discharge due to : progressing toward all LTGs   []  Other:      Therapist: Johnnie Isabel PT    Date: 7/18/2018 Time: 10:50 AM     Future Appointments  Date Time Provider David Bowen   7/18/2018 12:00 PM Johnnie Isabel PT [facility]